# Patient Record
Sex: FEMALE | Race: WHITE | Employment: UNEMPLOYED | URBAN - METROPOLITAN AREA
[De-identification: names, ages, dates, MRNs, and addresses within clinical notes are randomized per-mention and may not be internally consistent; named-entity substitution may affect disease eponyms.]

---

## 2017-11-02 PROBLEM — M05.79 RHEUMATOID ARTHRITIS INVOLVING MULTIPLE SITES WITH POSITIVE RHEUMATOID FACTOR (HCC): Status: ACTIVE | Noted: 2017-11-02

## 2017-12-21 ENCOUNTER — TELEPHONE (OUTPATIENT)
Dept: FAMILY MEDICINE CLINIC | Age: 57
End: 2017-12-21

## 2017-12-21 NOTE — TELEPHONE ENCOUNTER
Patient is requesting for Dr Diomedes Morrissey to call in allergy meds and something for her voice horseness. Patient do not know the name of medication for allergy. She was very hard to understand because of the horseness of her voice. Advised that Dr. Diomedes Morrissey was out of the office for the rest of the day and will not return til Tues of next week. Also informed her that her message would be submitted to Dr. Farhad Lara nurse. She stated she understood.

## 2017-12-28 RX ORDER — MINERAL OIL
180 ENEMA (ML) RECTAL
Status: CANCELLED | OUTPATIENT
Start: 2017-12-28

## 2018-06-13 PROBLEM — M79.7 FIBROMYALGIA: Status: ACTIVE | Noted: 2018-06-13

## 2019-07-02 PROBLEM — E66.01 OBESITY, MORBID (HCC): Status: ACTIVE | Noted: 2019-07-02
